# Patient Record
Sex: FEMALE | URBAN - METROPOLITAN AREA
[De-identification: names, ages, dates, MRNs, and addresses within clinical notes are randomized per-mention and may not be internally consistent; named-entity substitution may affect disease eponyms.]

---
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
## 2022-11-17 ENCOUNTER — NON-APPOINTMENT (OUTPATIENT)
Age: 22
End: 2022-11-17

## 2023-03-15 ENCOUNTER — NON-APPOINTMENT (OUTPATIENT)
Age: 23
End: 2023-03-15

## 2024-02-01 ENCOUNTER — APPOINTMENT (OUTPATIENT)
Dept: GASTROENTEROLOGY | Facility: CLINIC | Age: 24
End: 2024-02-01

## 2024-06-24 ENCOUNTER — EMERGENCY (EMERGENCY)
Facility: HOSPITAL | Age: 24
LOS: 1 days | Discharge: HOME | End: 2024-06-24
Attending: EMERGENCY MEDICINE
Payer: COMMERCIAL

## 2024-06-24 VITALS
SYSTOLIC BLOOD PRESSURE: 131 MMHG | HEIGHT: 63 IN | HEART RATE: 105 BPM | DIASTOLIC BLOOD PRESSURE: 93 MMHG | WEIGHT: 169.09 LBS | OXYGEN SATURATION: 100 % | RESPIRATION RATE: 18 BRPM | TEMPERATURE: 98 F

## 2024-06-25 VITALS
DIASTOLIC BLOOD PRESSURE: 83 MMHG | HEART RATE: 79 BPM | OXYGEN SATURATION: 100 % | SYSTOLIC BLOOD PRESSURE: 119 MMHG | RESPIRATION RATE: 18 BRPM

## 2024-06-25 RX ORDER — ACETAMINOPHEN 500 MG
975 TABLET ORAL ONCE
Refills: 0 | Status: COMPLETED | OUTPATIENT
Start: 2024-06-25 | End: 2024-06-25

## 2024-06-25 RX ORDER — FAMOTIDINE 10 MG/ML
20 INJECTION INTRAVENOUS ONCE
Refills: 0 | Status: COMPLETED | OUTPATIENT
Start: 2024-06-25 | End: 2024-06-25

## 2024-06-25 RX ORDER — IBUPROFEN 200 MG
600 TABLET ORAL ONCE
Refills: 0 | Status: COMPLETED | OUTPATIENT
Start: 2024-06-25 | End: 2024-06-25

## 2024-06-25 RX ADMIN — FAMOTIDINE 20 MILLIGRAM(S): 10 INJECTION INTRAVENOUS at 02:39

## 2024-06-25 RX ADMIN — Medication 975 MILLIGRAM(S): at 01:00

## 2024-06-25 RX ADMIN — Medication 600 MILLIGRAM(S): at 01:00

## 2024-06-25 RX ADMIN — Medication 30 MILLILITER(S): at 02:39

## 2024-06-25 NOTE — ED PROVIDER NOTE - PATIENT PORTAL LINK FT
You can access the FollowMyHealth Patient Portal offered by [facility] by registering at the following website: [email or web address]. By joining [facility]’s FollowMyHealth portal, you will also be able to view your health information using other applications (apps) compatible with our system.
Principal Discharge DX:	Wrist sprain, left, initial encounter

## 2024-06-25 NOTE — ED PROVIDER NOTE - OBJECTIVE STATEMENT
24Y F no reported PMH presenting with jaw pain x1 day. 24Y F no reported PMH presenting with jaw pain x1 day. Pain is on R side and came on suddenly. No trauma; cannot identify any provoking incident that caused pain. Reports unable to fully range jaw without pain. 24Y F no reported PMH presenting with jaw pain x1 day. Pain is on R side and came on suddenly. No trauma; cannot identify any provoking incident that caused pain. Reports unable to fully range jaw without pain. No pain meds attempted prior to arrival.

## 2024-06-25 NOTE — ED PROVIDER NOTE - PHYSICAL EXAMINATION
On physical examination patient is well-appearing neuro intact clear oropharynx tenderness to palpation of the TMJ on the right side clear TMs clear lungs S1-S2 soft nontender abdomen

## 2024-06-25 NOTE — ED PROVIDER NOTE - CLINICAL SUMMARY MEDICAL DECISION MAKING FREE TEXT BOX
24-year-old female with past medical history of GERD presenting with pain of her jaw mostly on the right side and difficulty opening she describes no similar episodes in the past however has been feeling like clicking sounds when opening her jaw denies any trauma to the head or face denies fever chills cough sore throat runny nose during the episodes of worsening of pain she was anxious and had feeling of palpitations and tingling of her hands and decided to present here today  On physical examination patient is well-appearing neuro intact clear oropharynx tenderness to palpation of the TMJ on the right side clear TMs clear lungs S1-S2 soft nontender abdomen  Concern for TMJ inflammation advised the patient to see OMFS and reassured her about symptoms and return precautions

## 2024-06-25 NOTE — ED ADULT NURSE NOTE - OBJECTIVE STATEMENT
24 y.o F A&Ox4 w. no significant PMH presents to ED complaining of mouth pain. Pt states that right sided jaw pain started this evening. Pt is endorsing right sided jaw pain and "overall stiff joints." Pt denies any recent dental procedures, difficulty swallowing and breathing. Pt VSS at this time. Pt denies fever, chills, n/v/d, SOB, chest pain. VSS at this time

## 2024-06-25 NOTE — ED ADULT NURSE NOTE - NSFALLUNIVINTERV_ED_ALL_ED
Bed/Stretcher in lowest position, wheels locked, appropriate side rails in place/Call bell, personal items and telephone in reach/Instruct patient to call for assistance before getting out of bed/chair/stretcher/Non-slip footwear applied when patient is off stretcher/Shelley to call system/Physically safe environment - no spills, clutter or unnecessary equipment/Purposeful proactive rounding/Room/bathroom lighting operational, light cord in reach

## 2024-07-24 ENCOUNTER — APPOINTMENT (OUTPATIENT)
Dept: GASTROENTEROLOGY | Facility: CLINIC | Age: 24
End: 2024-07-24
Payer: COMMERCIAL

## 2024-07-24 VITALS
TEMPERATURE: 98.8 F | HEART RATE: 78 BPM | SYSTOLIC BLOOD PRESSURE: 123 MMHG | HEIGHT: 63 IN | OXYGEN SATURATION: 98 % | WEIGHT: 168 LBS | DIASTOLIC BLOOD PRESSURE: 82 MMHG | BODY MASS INDEX: 29.77 KG/M2

## 2024-07-24 DIAGNOSIS — K21.00 GASTRO-ESOPHAGEAL REFLUX DISEASE WITH ESOPHAGITIS, WITHOUT BLEEDING: ICD-10-CM

## 2024-07-24 DIAGNOSIS — R11.2 NAUSEA WITH VOMITING, UNSPECIFIED: ICD-10-CM

## 2024-07-24 DIAGNOSIS — R10.11 RIGHT UPPER QUADRANT PAIN: ICD-10-CM

## 2024-07-24 DIAGNOSIS — R10.13 EPIGASTRIC PAIN: ICD-10-CM

## 2024-07-24 DIAGNOSIS — R09.3 ABNORMAL SPUTUM: ICD-10-CM

## 2024-07-24 PROCEDURE — 99204 OFFICE O/P NEW MOD 45 MIN: CPT

## 2024-07-24 RX ORDER — NORGESTIMATE AND ETHINYL ESTRADIOL 7DAYSX3 LO
0.18/0.215/0.25 KIT ORAL
Refills: 0 | Status: ACTIVE | COMMUNITY

## 2024-07-24 RX ORDER — OMEPRAZOLE 40 MG/1
40 CAPSULE, DELAYED RELEASE ORAL TWICE DAILY
Qty: 60 | Refills: 5 | Status: ACTIVE | COMMUNITY
Start: 2024-07-24 | End: 1900-01-01

## 2024-07-24 NOTE — ASSESSMENT
[FreeTextEntry1] : Referred pt for US abdomen, will f/u with results. Omeprazole 40 mg prescribed, instructed pt to take BID. Referred pt for EGD. Pt expressed understanding and agrees with the plan.  A low acid/reflux diet was discussed in great detail including not smoking, not drinking alcohol, and not consuming foods that irritate the esophagus. It is helpful to eat small meals throughout the day instead of large meals. You should avoid eating before bedtime or lying down after you eat. It can be helpful to raise the head of your bed six inches. Additionally, you should maintain a healthy weight and good posture. The patient was given written material to take home and review.  I spent 45 minutes with the patient as well as reviewing documents prior to and after the office visit. Patient verbalized understanding of all information provided. All questions answered and reviewed.  Robert Brunner, MD

## 2024-07-24 NOTE — PHYSICAL EXAM
[Alert] : alert [Normal Voice/Communication] : normal voice/communication [Healthy Appearing] : healthy appearing [No Acute Distress] : no acute distress [Sclera] : the sclera and conjunctiva were normal [Hearing Threshold Finger Rub Not [place]] : hearing was normal [Normal Lips/Gums] : the lips and gums were normal [Oropharynx] : the oropharynx was normal [Normal Appearance] : the appearance of the neck was normal [No Neck Mass] : no neck mass was observed [No Respiratory Distress] : no respiratory distress [No Acc Muscle Use] : no accessory muscle use [Respiration, Rhythm And Depth] : normal respiratory rhythm and effort [Auscultation Breath Sounds / Voice Sounds] : lungs were clear to auscultation bilaterally [Heart Rate And Rhythm] : heart rate was normal and rhythm regular [Normal S1, S2] : normal S1 and S2 [Murmurs] : no murmurs [Bowel Sounds] : normal bowel sounds [No Masses] : no abdominal mass palpated [Abdomen Soft] : soft [] : no hepatosplenomegaly [RUQ] : RUQ [Oriented To Time, Place, And Person] : oriented to person, place, and time [Epigastric] : epigastric [Rebound Tenderness] : no rebound tenderness [Rebound] : no rebound [Guarding] : no guarding [de-identified] : RUQ and epigastric tenderness, no guarding or rebound

## 2024-07-24 NOTE — REVIEW OF SYSTEMS
[As Noted in HPI] : as noted in HPI [Vomiting] : vomiting [Heartburn] : heartburn [Negative] : Heme/Lymph [Abdominal Pain] : abdominal pain [FreeTextEntry4] : excess sputum production [FreeTextEntry7] : nausea

## 2024-07-24 NOTE — HISTORY OF PRESENT ILLNESS
[FreeTextEntry1] : Thank you for consult.  Patient is a 23 y/o female with a PMHx of GERD with Esophagitis, currently on birth control, who presents c/o heartburn after eating, excess sputum production, n/v after eating, as well as epigastric and RUQ abdominal pain x several years. Pt states she takes OTC Tums daily for sx, which provided no relief. Pt states she had an EGD 2 years ago at an external facility, which showed esophagitis, was prescribed Omeprazole, but states she was "too scared to take it because she did not want to be on it forever". Pt states that she was [place]. Pt denies smoking cigarettes or consuming alcohol. Pt has never had a screening colonoscopy.

## 2024-07-24 NOTE — REASON FOR VISIT
[Initial Evaluation] : an initial evaluation [FreeTextEntry1] : GERD, Excess Sputum, N/v, RUQ abdominal pain, Epigastric abdominal pain

## 2024-08-05 ENCOUNTER — APPOINTMENT (OUTPATIENT)
Dept: GASTROENTEROLOGY | Facility: AMBULATORY MEDICAL SERVICES | Age: 24
End: 2024-08-05

## 2024-08-05 PROCEDURE — 43239 EGD BIOPSY SINGLE/MULTIPLE: CPT

## 2024-08-21 ENCOUNTER — NON-APPOINTMENT (OUTPATIENT)
Age: 24
End: 2024-08-21

## 2024-08-22 ENCOUNTER — APPOINTMENT (OUTPATIENT)
Dept: BARIATRICS | Facility: CLINIC | Age: 24
End: 2024-08-22
Payer: COMMERCIAL

## 2024-08-22 VITALS
DIASTOLIC BLOOD PRESSURE: 84 MMHG | OXYGEN SATURATION: 98 % | BODY MASS INDEX: 31.72 KG/M2 | HEART RATE: 79 BPM | HEIGHT: 63 IN | TEMPERATURE: 97.9 F | SYSTOLIC BLOOD PRESSURE: 120 MMHG | WEIGHT: 179.03 LBS

## 2024-08-22 DIAGNOSIS — K21.9 DIAPHRAGMATIC HERNIA W/OUT OBSTRUCTION OR GANGRENE: ICD-10-CM

## 2024-08-22 DIAGNOSIS — K44.9 DIAPHRAGMATIC HERNIA W/OUT OBSTRUCTION OR GANGRENE: ICD-10-CM

## 2024-08-22 PROCEDURE — 99204 OFFICE O/P NEW MOD 45 MIN: CPT

## 2024-08-22 NOTE — PLAN
[FreeTextEntry1] : - Esophagram - GE Study - F/U after studies to discuss results - Possible surgery in January 2025  45 minutes spent with patient and coordinating care

## 2024-08-22 NOTE — HISTORY OF PRESENT ILLNESS
[de-identified] : Pt is a 25 y/o woman presenting for evaluation of hiatal hernia with GERD.  She has been experiencing reflux, heartburn, vomiting and spitting up after meals Cannot lie supine after meals Symptoms improved after PPI She takes Tums every day She is in college and starting semester at [employer].    Meds: omeprazole 20mg Daily OCP  PSH: Denies Allergies: NKDA

## 2024-09-18 ENCOUNTER — APPOINTMENT (OUTPATIENT)
Dept: GASTROENTEROLOGY | Facility: CLINIC | Age: 24
End: 2024-09-18
Payer: COMMERCIAL

## 2024-09-18 DIAGNOSIS — K21.9 GASTRO-ESOPHAGEAL REFLUX DISEASE W/OUT ESOPHAGITIS: ICD-10-CM

## 2024-09-18 DIAGNOSIS — K20.0 EOSINOPHILIC ESOPHAGITIS: ICD-10-CM

## 2024-09-18 DIAGNOSIS — K76.89 OTHER SPECIFIED DISEASES OF LIVER: ICD-10-CM

## 2024-09-18 DIAGNOSIS — R09.3 ABNORMAL SPUTUM: ICD-10-CM

## 2024-09-18 DIAGNOSIS — K44.9 DIAPHRAGMATIC HERNIA W/OUT OBSTRUCTION OR GANGRENE: ICD-10-CM

## 2024-09-18 DIAGNOSIS — K29.50 UNSPECIFIED CHRONIC GASTRITIS W/OUT BLEEDING: ICD-10-CM

## 2024-09-18 PROCEDURE — 99214 OFFICE O/P EST MOD 30 MIN: CPT

## 2024-09-18 RX ORDER — FLUTICASONE PROPIONATE 110 UG/1
110 AEROSOL, METERED RESPIRATORY (INHALATION)
Qty: 1 | Refills: 5 | Status: ACTIVE | COMMUNITY
Start: 2024-09-18 | End: 1900-01-01

## 2024-09-18 NOTE — REVIEW OF SYSTEMS
[As Noted in HPI] : as noted in HPI [Heartburn] : heartburn [Negative] : Heme/Lymph [FreeTextEntry7] : excess sputum production

## 2024-09-18 NOTE — REASON FOR VISIT
[Home] : at home, [unfilled] , at the time of the visit. [Medical Office: ([facility])___] : at the medical office located in  [Patient] : the patient [Self] : self [Post-op/Procedure: _________] : a [unfilled] post-op/procedure visit [FreeTextEntry1] : Eosinophilic Esophagitis

## 2024-09-18 NOTE — ASSESSMENT
[FreeTextEntry1] : Flovent 110 mcg prescribed, instructed pt to spray and swallow BID. Instructed pt to f/u in office in 2 days. Pt expressed understanding and agrees with the plan.  Eosinophilic esophagitis (e-o-sin-o-FILL-ik uh-sof-uh-AMANUEL-tis) is a chronic immune system disease. With this disease, a type of white blood cell, called an eosinophil, builds up in the lining of the tube that connects your mouth to your stomach. This tube is also called the esophagus. This buildup, which is a reaction to foods, allergens or acid reflux, can inflame or injure the esophageal tissue. Damaged esophageal tissue can lead to difficulty swallowing or cause food to get stuck when you swallow.    Eosinophilic esophagitis has been identified only since the early '90s, but is now considered a major cause of digestive system illness. Research is ongoing and will likely lead to revisions in the diagnosis and treatment of eosinophilic esophagitis. Symptoms Signs and symptoms include:   Adults:   Difficulty swallowing, also called dysphagia Food getting stuck in the esophagus after swallowing, also known as impaction Chest pain that is often centrally located and does not respond to antacids Backflow of undigested food, known as regurgitation Children:   Difficulty feeding, in infants Difficulty eating, in children Vomiting Abdominal pain Difficulty swallowing, also called dysphagia Food getting stuck in the esophagus after swallowing, also known as impaction No response to GERD medication Failure to thrive, including poor growth, malnutrition and weight loss When to see a doctor Seek immediate medical attention if you experience chest pain, especially if you also have shortness of breath or jaw or arm pain. These may be symptoms of a heart attack.   Make an appointment with your health care provider if you experience severe or frequent eosinophilic esophagitis symptoms. If you take nonprescription medicines for heartburn more than twice a week, see your health care provider.  Causes Eosinophils are a typical type of white blood cells present in your digestive tract. However, in eosinophilic esophagitis, you have an allergic reaction to an outside substance. The reaction may occur as follows:   Reaction of the esophagus. The lining of your esophagus reacts to allergens, such as food or pollen. Multiplication of eosinophils. The eosinophils multiply in your esophagus and produce a protein that causes inflammation. Damage to the esophagus. Inflammation can lead to scarring, narrowing and formation of excessive fibrous tissue in the lining of your esophagus. Dysphagia and impaction. You may have difficulty swallowing, called dysphagia. Or food may become stuck when you swallow. This is known as impaction. Additional symptoms. You may have other symptoms, such as chest pain or stomach pain. There has been a significant increase in numbers of people diagnosed with eosinophilic esophagitis in the past decade. At first, researchers thought this was due to an increase in awareness among health care providers and greater availability of tests. However, studies now suggest that the disease is becoming increasingly common, parallel to the increase in asthma and allergies.   Risk factors The following risk factors are associated with eosinophilic esophagitis:   Climate. People who live in a cold or dry climate are more likely than those in other climates to be diagnosed with eosinophilic esophagitis. Season. You're more likely to be diagnosed between the spring and fall, probably because levels of pollen and other allergens are higher and people are more likely to be outdoors. Sex. Eosinophilic esophagitis is more common in males than in females. Family history. Researchers think that eosinophilic esophagitis may have a genetic component because the condition sometimes runs in families. If your family members have eosinophilic esophagitis, you have a greater chance of being diagnosed. Allergies and asthma. If you have food or environmental allergies, asthma, atopic dermatitis, or a chronic respiratory disease, you're more likely to be diagnosed with eosinophilic esophagitis. Age. Originally, eosinophilic esophagitis was thought to be a childhood disease, but now it is known to be common in adults as well. The symptoms differ somewhat between children and adults. Complications In some people, eosinophilic esophagitis can lead to the following:   Scarring and narrowing of the esophagus. This makes it difficult to swallow and more likely that you will have food get stuck. Damage to the esophagus. Because of inflammation of the esophagus, endoscopy can cause perforation or tears in the tissue that lines the esophagus. Tearing also can occur in connection with retching that some people experience when they get food stuck in the esophagus.  A low acid/reflux diet was discussed in great detail including not smoking, not drinking alcohol, and not consuming foods that irritate the esophagus. It is helpful to eat small meals throughout the day instead of large meals. You should avoid eating before bedtime or lying down after you eat. It can be helpful to raise the head of your bed six inches. Additionally, you should maintain a healthy weight and good posture. The patient was given written material to take home and review.  I spent 35 minutes reviewing the patient's records prior to arrival, with the patient, and reviewing records after the visit. All prior testing reviewed at length. Patient verbalized understanding of all information provided. All questions answered and reviewed.  Robert Brunner, MD

## 2024-09-18 NOTE — HISTORY OF PRESENT ILLNESS
[FreeTextEntry1] : Patient is a 23 y/o female with a PMHx of GERD, Gastritis, Esophageal Hiatal Hernia, Eosinophilic Esophagitis, and Hepatic nodule, currently on birth control and Omeprazole 40 mg BID, who presents for EGD f/u. Pt is currently c/o continued heartburn after eating and excess sputum production since her last office visit on 7/24/2024. US abdomen performed on 7/24/2024 showed an 8 mm echogenic nodule within the Rt lobe of the liver, possible hemangioma, radiologist recommended f/u to ensure stability. EGD was performed on 8/5/2024, which showed gastritis, 4 cm esophageal hiatal hernia, Eosinophilic Esophagitis, and was negative for H. Pylori. Pt has never had a screening colonoscopy.

## 2025-01-04 ENCOUNTER — NON-APPOINTMENT (OUTPATIENT)
Age: 25
End: 2025-01-04